# Patient Record
Sex: FEMALE | Race: WHITE | NOT HISPANIC OR LATINO | Employment: UNEMPLOYED | URBAN - NONMETROPOLITAN AREA
[De-identification: names, ages, dates, MRNs, and addresses within clinical notes are randomized per-mention and may not be internally consistent; named-entity substitution may affect disease eponyms.]

---

## 2017-04-17 ENCOUNTER — APPOINTMENT (EMERGENCY)
Dept: RADIOLOGY | Facility: HOSPITAL | Age: 47
End: 2017-04-17
Payer: COMMERCIAL

## 2017-04-17 ENCOUNTER — HOSPITAL ENCOUNTER (EMERGENCY)
Facility: HOSPITAL | Age: 47
Discharge: HOME | End: 2017-04-17
Attending: EMERGENCY MEDICINE | Admitting: EMERGENCY MEDICINE
Payer: COMMERCIAL

## 2017-04-17 VITALS
RESPIRATION RATE: 18 BRPM | DIASTOLIC BLOOD PRESSURE: 84 MMHG | TEMPERATURE: 98.1 F | OXYGEN SATURATION: 97 % | SYSTOLIC BLOOD PRESSURE: 126 MMHG | WEIGHT: 130 LBS | HEART RATE: 85 BPM

## 2017-04-17 DIAGNOSIS — J20.9 ACUTE BRONCHITIS: Primary | ICD-10-CM

## 2017-04-17 PROCEDURE — 71020 HB CHEST X-RAY 2VW FRONTAL&LATL: CPT

## 2017-04-17 PROCEDURE — 99283 EMERGENCY DEPT VISIT LOW MDM: CPT

## 2017-04-17 RX ORDER — CITALOPRAM 40 MG/1
40 TABLET ORAL DAILY
COMMUNITY

## 2017-04-17 RX ORDER — BENZONATATE 100 MG/1
100 CAPSULE ORAL ONCE
Status: COMPLETED | OUTPATIENT
Start: 2017-04-17 | End: 2017-04-17

## 2017-04-17 RX ORDER — TRAZODONE HYDROCHLORIDE 100 MG/1
100 TABLET ORAL
COMMUNITY

## 2017-04-17 RX ORDER — BENZONATATE 100 MG/1
100 CAPSULE ORAL EVERY 8 HOURS
Qty: 9 CAPSULE | Refills: 0 | Status: SHIPPED | OUTPATIENT
Start: 2017-04-17 | End: 2017-04-20

## 2017-04-17 RX ORDER — PANTOPRAZOLE SODIUM 40 MG/1
40 TABLET, DELAYED RELEASE ORAL DAILY
COMMUNITY

## 2017-04-17 RX ORDER — SUCRALFATE 1 G/1
1 TABLET ORAL 4 TIMES DAILY
COMMUNITY

## 2017-04-17 RX ADMIN — BENZONATATE 100 MG: 100 CAPSULE ORAL at 20:56

## 2021-06-16 ENCOUNTER — HOSPITAL ENCOUNTER (EMERGENCY)
Facility: HOSPITAL | Age: 51
Discharge: HOME | End: 2021-06-16
Attending: EMERGENCY MEDICINE | Admitting: EMERGENCY MEDICINE
Payer: COMMERCIAL

## 2021-06-16 ENCOUNTER — APPOINTMENT (EMERGENCY)
Dept: CT IMAGING | Facility: HOSPITAL | Age: 51
End: 2021-06-16
Payer: COMMERCIAL

## 2021-06-16 VITALS
TEMPERATURE: 97.4 F | WEIGHT: 135 LBS | BODY MASS INDEX: 24.84 KG/M2 | HEART RATE: 66 BPM | HEIGHT: 62 IN | DIASTOLIC BLOOD PRESSURE: 65 MMHG | OXYGEN SATURATION: 98 % | RESPIRATION RATE: 16 BRPM | SYSTOLIC BLOOD PRESSURE: 114 MMHG

## 2021-06-16 DIAGNOSIS — M54.9 BACK PAIN: Primary | ICD-10-CM

## 2021-06-16 DIAGNOSIS — R10.9 ACUTE RIGHT FLANK PAIN: ICD-10-CM

## 2021-06-16 DIAGNOSIS — R93.5 ABNORMAL CT OF THE ABDOMEN: ICD-10-CM

## 2021-06-16 DIAGNOSIS — R11.0 NAUSEA: ICD-10-CM

## 2021-06-16 LAB
ALBUMIN SERPL BCP-MCNC: 4.3 G/DL (ref 3.5–5.7)
ALP SERPL-CCNC: 70 U/L (ref 40–150)
ALT SERPL W P-5'-P-CCNC: 13 U/L (ref 7–52)
ANION GAP SERPL CALCULATED.3IONS-SCNC: 8 MMOL/L (ref 4–13)
AST SERPL W P-5'-P-CCNC: 12 U/L (ref 13–39)
BACTERIA UR QL AUTO: NORMAL /HPF
BASOPHILS # BLD AUTO: 0.1 THOUSANDS/ΜL (ref 0–0.1)
BASOPHILS NFR BLD AUTO: 0 % (ref 0–2)
BILIRUB SERPL-MCNC: 0.3 MG/DL (ref 0.2–1)
BILIRUB UR QL STRIP: NEGATIVE
BUN SERPL-MCNC: 10 MG/DL (ref 7–25)
CALCIUM SERPL-MCNC: 9.3 MG/DL (ref 8.6–10.5)
CHLORIDE SERPL-SCNC: 107 MMOL/L (ref 98–107)
CLARITY UR: CLEAR
CO2 SERPL-SCNC: 24 MMOL/L (ref 21–31)
COLOR UR: YELLOW
CREAT SERPL-MCNC: 0.77 MG/DL (ref 0.6–1.2)
EOSINOPHIL # BLD AUTO: 0 THOUSAND/ΜL (ref 0–0.61)
EOSINOPHIL NFR BLD AUTO: 0 % (ref 0–5)
ERYTHROCYTE [DISTWIDTH] IN BLOOD BY AUTOMATED COUNT: 13.8 % (ref 11.5–14.5)
GFR SERPL CREATININE-BSD FRML MDRD: 91 ML/MIN/1.73SQ M
GLUCOSE SERPL-MCNC: 129 MG/DL (ref 65–99)
GLUCOSE UR STRIP-MCNC: NEGATIVE MG/DL
HCT VFR BLD AUTO: 40.7 % (ref 42–47)
HGB BLD-MCNC: 13.8 G/DL (ref 12–16)
HGB UR QL STRIP.AUTO: ABNORMAL
KETONES UR STRIP-MCNC: NEGATIVE MG/DL
LEUKOCYTE ESTERASE UR QL STRIP: NEGATIVE
LYMPHOCYTES # BLD AUTO: 2 THOUSANDS/ΜL (ref 0.6–4.47)
LYMPHOCYTES NFR BLD AUTO: 14 % (ref 21–51)
MCH RBC QN AUTO: 32.6 PG (ref 26–34)
MCHC RBC AUTO-ENTMCNC: 33.9 G/DL (ref 31–37)
MCV RBC AUTO: 96 FL (ref 81–99)
MONOCYTES # BLD AUTO: 0.4 THOUSAND/ΜL (ref 0.17–1.22)
MONOCYTES NFR BLD AUTO: 3 % (ref 2–12)
NEUTROPHILS # BLD AUTO: 11.8 THOUSANDS/ΜL (ref 1.4–6.5)
NEUTS SEG NFR BLD AUTO: 83 % (ref 42–75)
NITRITE UR QL STRIP: NEGATIVE
NON-SQ EPI CELLS URNS QL MICRO: NORMAL /HPF
PH UR STRIP.AUTO: 6 [PH]
PLATELET # BLD AUTO: 351 THOUSANDS/UL (ref 149–390)
PMV BLD AUTO: 7.7 FL (ref 8.6–11.7)
POTASSIUM SERPL-SCNC: 4.2 MMOL/L (ref 3.5–5.5)
PROT SERPL-MCNC: 6.4 G/DL (ref 6.4–8.9)
PROT UR STRIP-MCNC: NEGATIVE MG/DL
RBC # BLD AUTO: 4.22 MILLION/UL (ref 3.9–5.2)
RBC #/AREA URNS AUTO: NORMAL /HPF
SODIUM SERPL-SCNC: 139 MMOL/L (ref 134–143)
SP GR UR STRIP.AUTO: <=1.005 (ref 1–1.03)
UROBILINOGEN UR QL STRIP.AUTO: 0.2 E.U./DL
WBC # BLD AUTO: 14.2 THOUSAND/UL (ref 4.8–10.8)
WBC #/AREA URNS AUTO: NORMAL /HPF

## 2021-06-16 PROCEDURE — 96361 HYDRATE IV INFUSION ADD-ON: CPT

## 2021-06-16 PROCEDURE — G1004 CDSM NDSC: HCPCS

## 2021-06-16 PROCEDURE — 81003 URINALYSIS AUTO W/O SCOPE: CPT | Performed by: PHYSICIAN ASSISTANT

## 2021-06-16 PROCEDURE — 85025 COMPLETE CBC W/AUTO DIFF WBC: CPT | Performed by: PHYSICIAN ASSISTANT

## 2021-06-16 PROCEDURE — 36415 COLL VENOUS BLD VENIPUNCTURE: CPT | Performed by: PHYSICIAN ASSISTANT

## 2021-06-16 PROCEDURE — 81001 URINALYSIS AUTO W/SCOPE: CPT | Performed by: PHYSICIAN ASSISTANT

## 2021-06-16 PROCEDURE — 80053 COMPREHEN METABOLIC PANEL: CPT | Performed by: PHYSICIAN ASSISTANT

## 2021-06-16 PROCEDURE — 96374 THER/PROPH/DIAG INJ IV PUSH: CPT

## 2021-06-16 PROCEDURE — 99285 EMERGENCY DEPT VISIT HI MDM: CPT | Performed by: PHYSICIAN ASSISTANT

## 2021-06-16 PROCEDURE — 96375 TX/PRO/DX INJ NEW DRUG ADDON: CPT

## 2021-06-16 PROCEDURE — 74176 CT ABD & PELVIS W/O CONTRAST: CPT

## 2021-06-16 PROCEDURE — 99284 EMERGENCY DEPT VISIT MOD MDM: CPT

## 2021-06-16 RX ORDER — BACLOFEN 10 MG/1
10 TABLET ORAL 3 TIMES DAILY
Qty: 12 TABLET | Refills: 0 | Status: SHIPPED | OUTPATIENT
Start: 2021-06-16 | End: 2021-06-20

## 2021-06-16 RX ORDER — DIAZEPAM 5 MG/ML
2.5 INJECTION, SOLUTION INTRAMUSCULAR; INTRAVENOUS ONCE
Status: COMPLETED | OUTPATIENT
Start: 2021-06-16 | End: 2021-06-16

## 2021-06-16 RX ORDER — KETOROLAC TROMETHAMINE 30 MG/ML
15 INJECTION, SOLUTION INTRAMUSCULAR; INTRAVENOUS ONCE
Status: COMPLETED | OUTPATIENT
Start: 2021-06-16 | End: 2021-06-16

## 2021-06-16 RX ORDER — ONDANSETRON 2 MG/ML
4 INJECTION INTRAMUSCULAR; INTRAVENOUS ONCE
Status: COMPLETED | OUTPATIENT
Start: 2021-06-16 | End: 2021-06-16

## 2021-06-16 RX ORDER — LINACLOTIDE 145 UG/1
145 CAPSULE, GELATIN COATED ORAL DAILY
COMMUNITY

## 2021-06-16 RX ORDER — MONTELUKAST SODIUM 10 MG/1
10 TABLET ORAL
COMMUNITY

## 2021-06-16 RX ORDER — BUPROPION HYDROCHLORIDE 150 MG/1
150 TABLET ORAL DAILY
COMMUNITY

## 2021-06-16 RX ORDER — ONDANSETRON 4 MG/1
4 TABLET, ORALLY DISINTEGRATING ORAL EVERY 6 HOURS PRN
Qty: 10 TABLET | Refills: 0 | Status: SHIPPED | OUTPATIENT
Start: 2021-06-16

## 2021-06-16 RX ADMIN — ONDANSETRON 4 MG: 2 INJECTION INTRAMUSCULAR; INTRAVENOUS at 10:50

## 2021-06-16 RX ADMIN — DIAZEPAM 2.5 MG: 10 INJECTION, SOLUTION INTRAMUSCULAR; INTRAVENOUS at 12:29

## 2021-06-16 RX ADMIN — SODIUM CHLORIDE 1000 ML: 0.9 INJECTION, SOLUTION INTRAVENOUS at 10:49

## 2021-06-16 RX ADMIN — KETOROLAC TROMETHAMINE 15 MG: 30 INJECTION, SOLUTION INTRAMUSCULAR at 10:50

## 2021-06-16 NOTE — ED PROVIDER NOTES
History  Chief Complaint   Patient presents with    Back Pain     According to the patient, she has had bilateral lower back pain that started over the weekend but the patient reports having more pain today  This is a 82-year-old female patient who presents with 1 week of back pain right greater than left  She states that she has urgency frequency and dysuria  Was seen by her family doctor a few days ago was put on Medrol for back pain  She states that the suprapubic pain has been constant over last 2 days and the back pains intermittent  But is bilateral   Last night she started with chills and nausea  No vomiting no documented fever no headache blurred vision double vision cough congestion sore throat no chest pain or shortness of breath  Nothing makes it better or worse she has taken Tylenol over-the-counter  Differential diagnosis includes not limited to UTI, kidney stone, pyelonephritis  Musculoskeletal is also on the differential            Prior to Admission Medications   Prescriptions Last Dose Informant Patient Reported? Taking?    Cetirizine HCl (ZYRTEC PO)   Yes Yes   Sig: Take 10 mg by mouth   buPROPion (WELLBUTRIN XL) 150 mg 24 hr tablet   Yes Yes   Sig: Take 150 mg by mouth daily   citalopram (CeleXA) 40 mg tablet   Yes No   Sig: Take 40 mg by mouth daily   linaCLOtide (Linzess) 145 MCG CAPS   Yes No   Sig: Take 145 mcg by mouth daily   montelukast (SINGULAIR) 10 mg tablet   Yes Yes   Sig: Take 10 mg by mouth daily at bedtime   pantoprazole (PROTONIX) 40 mg tablet   Yes No   Sig: Take 40 mg by mouth daily   sucralfate (CARAFATE) 1 g tablet Not Taking at Unknown time  Yes No   Sig: Take 1 g by mouth 4 (four) times a day   traZODone (DESYREL) 100 mg tablet   Yes No   Sig: Take 100 mg by mouth daily at bedtime      Facility-Administered Medications: None       Past Medical History:   Diagnosis Date    Celiac disease     Fibromyalgia     GERD (gastroesophageal reflux disease)        Past Surgical History:   Procedure Laterality Date    APPENDECTOMY      CHOLECYSTECTOMY      HERNIA REPAIR      HYSTERECTOMY      TONSILLECTOMY         History reviewed  No pertinent family history  I have reviewed and agree with the history as documented  E-Cigarette/Vaping    E-Cigarette Use Never User      E-Cigarette/Vaping Substances     Social History     Tobacco Use    Smoking status: Current Every Day Smoker     Packs/day: 0 50     Types: Cigarettes    Smokeless tobacco: Never Used   Vaping Use    Vaping Use: Never used   Substance Use Topics    Alcohol use: No    Drug use: No       Review of Systems   Constitutional: Positive for chills  Negative for fatigue and fever  HENT: Negative for congestion and hearing loss  Eyes: Negative for photophobia and pain  Respiratory: Negative for cough and chest tightness  Cardiovascular: Negative for chest pain and leg swelling  Gastrointestinal: Positive for abdominal pain and nausea  Negative for anal bleeding, blood in stool, constipation, diarrhea, rectal pain and vomiting  Endocrine: Negative for polydipsia and polyphagia  Genitourinary: Positive for dysuria, flank pain and urgency  Negative for decreased urine volume, difficulty urinating, enuresis, frequency, genital sores, hematuria, menstrual problem, pelvic pain, vaginal bleeding, vaginal discharge and vaginal pain  Musculoskeletal: Negative for arthralgias and gait problem  Skin: Negative for pallor and rash  Allergic/Immunologic: Negative for environmental allergies and food allergies  Neurological: Negative for dizziness and headaches  Psychiatric/Behavioral: Negative for agitation and confusion  Physical Exam  Physical Exam  Vitals and nursing note reviewed  Constitutional:       General: She is not in acute distress  Appearance: She is well-developed  She is not ill-appearing, toxic-appearing or diaphoretic  HENT:      Head: Normocephalic and atraumatic  Right Ear: External ear normal       Left Ear: External ear normal       Nose: Nose normal       Mouth/Throat:      Mouth: Mucous membranes are moist       Pharynx: Oropharynx is clear  No oropharyngeal exudate  Eyes:      Conjunctiva/sclera: Conjunctivae normal       Pupils: Pupils are equal, round, and reactive to light  Cardiovascular:      Rate and Rhythm: Normal rate and regular rhythm  Pulmonary:      Effort: Pulmonary effort is normal       Breath sounds: Normal breath sounds  Abdominal:      General: Bowel sounds are normal       Palpations: Abdomen is soft  Tenderness: There is no abdominal tenderness  Musculoskeletal:         General: Normal range of motion  Cervical back: Normal range of motion and neck supple  Back:    Skin:     General: Skin is warm  Capillary Refill: Capillary refill takes less than 2 seconds  Neurological:      General: No focal deficit present  Mental Status: She is alert and oriented to person, place, and time  Mental status is at baseline     Psychiatric:         Behavior: Behavior normal          Vital Signs  ED Triage Vitals [06/16/21 1021]   Temperature Pulse Respirations Blood Pressure SpO2   (!) 97 4 °F (36 3 °C) 75 18 122/68 97 %      Temp Source Heart Rate Source Patient Position - Orthostatic VS BP Location FiO2 (%)   Temporal Monitor Lying Right arm --      Pain Score       9           Vitals:    06/16/21 1021 06/16/21 1231   BP: 122/68 114/65   Pulse: 75 66   Patient Position - Orthostatic VS: Lying Lying         Visual Acuity      ED Medications  Medications   sodium chloride 0 9 % bolus 1,000 mL (0 mL Intravenous Stopped 6/16/21 1223)   ketorolac (TORADOL) injection 15 mg (15 mg Intravenous Given 6/16/21 1050)   ondansetron (ZOFRAN) injection 4 mg (4 mg Intravenous Given 6/16/21 1050)   diazepam (VALIUM) injection 2 5 mg (2 5 mg Intravenous Given 6/16/21 1229)       Diagnostic Studies  Results Reviewed     Procedure Component Value Units Date/Time    Urine Microscopic [[id number]]  (Normal) Collected: 06/16/21 1030    Lab Status: Final result Specimen: Urine, Clean Catch Updated: 06/16/21 1144     RBC, UA 2-4 /hpf      WBC, UA None Seen /hpf      Epithelial Cells Occasional /hpf      Bacteria, UA None Seen /hpf     Comprehensive metabolic panel [[id number]]  (Abnormal) Collected: 06/16/21 1049    Lab Status: Final result Specimen: Blood from Arm, Left Updated: 06/16/21 1113     Sodium 139 mmol/L      Potassium 4 2 mmol/L      Chloride 107 mmol/L      CO2 24 mmol/L      ANION GAP 8 mmol/L      BUN 10 mg/dL      Creatinine 0 77 mg/dL      Glucose 129 mg/dL      Calcium 9 3 mg/dL      AST 12 U/L      ALT 13 U/L      Alkaline Phosphatase 70 U/L      Total Protein 6 4 g/dL      Albumin 4 3 g/dL      Total Bilirubin 0 30 mg/dL      eGFR 91 ml/min/1 73sq m     Narrative:      National Kidney Disease Foundation guidelines for Chronic Kidney Disease (CKD):     Stage 1 with normal or high GFR (GFR > 90 mL/min/1 73 square meters)    Stage 2 Mild CKD (GFR = 60-89 mL/min/1 73 square meters)    Stage 3A Moderate CKD (GFR = 45-59 mL/min/1 73 square meters)    Stage 3B Moderate CKD (GFR = 30-44 mL/min/1 73 square meters)    Stage 4 Severe CKD (GFR = 15-29 mL/min/1 73 square meters)    Stage 5 End Stage CKD (GFR <15 mL/min/1 73 square meters)  Note: GFR calculation is accurate only with a steady state creatinine    CBC and differential [[id number]]  (Abnormal) Collected: 06/16/21 1049    Lab Status: Final result Specimen: Blood from Arm, Left Updated: 06/16/21 1101     WBC 14 20 Thousand/uL      RBC 4 22 Million/uL      Hemoglobin 13 8 g/dL      Hematocrit 40 7 %      MCV 96 fL      MCH 32 6 pg      MCHC 33 9 g/dL      RDW 13 8 %      MPV 7 7 fL      Platelets 049 Thousands/uL      Neutrophils Relative 83 %      Lymphocytes Relative 14 %      Monocytes Relative 3 %      Eosinophils Relative 0 %      Basophils Relative 0 %      Neutrophils Absolute 11 80 Thousands/µL      Lymphocytes Absolute 2 00 Thousands/µL      Monocytes Absolute 0 40 Thousand/µL      Eosinophils Absolute 0 00 Thousand/µL      Basophils Absolute 0 10 Thousands/µL     UA w Reflex to Microscopic w Reflex to Culture [[id number]]  (Abnormal) Collected: 06/16/21 1030    Lab Status: Final result Specimen: Urine, Clean Catch Updated: 06/16/21 1037     Color, UA Yellow     Clarity, UA Clear     Specific Gravity, UA <=1 005     pH, UA 6 0     Leukocytes, UA Negative     Nitrite, UA Negative     Protein, UA Negative mg/dl      Glucose, UA Negative mg/dl      Ketones, UA Negative mg/dl      Urobilinogen, UA 0 2 E U /dl      Bilirubin, UA Negative     Blood, UA 3+                 CT renal stone study abdomen pelvis without contrast   Final Result by Jessica Deleon MD (06/16 1206)      1  No findings of obstructive uropathy or urinary tract calculi  2   Soft tissue contour deformity along the lesser curvature of the stomach, could be due to collapsed stomach but difficult to exclude a mass with evaluation limited  Consider correlation with endoscopy if clinically warranted  The study was marked in [facility] for immediate notification  Workstation performed: [id number]                    Procedures  Procedures         ED Course                                           MDM  Number of Diagnoses or Management Options  Acute right flank pain: new and requires workup  Back pain: new and requires workup  Diagnosis management comments: 42-year-old female patient who presented with right flank/back pain  Was on steroids without improvement  Patient had a negative CT scan except for questionable retraction of stomach which was due to being empty versus mass  Patient has no pain to that area  Sent clinical correlation on the study I do not feel there is however I will refer her to Gastroenterology    Patient's symptoms have completely resolved her Toradol and Valium which makes me think it may be from musculoskeletal   Patient did have some blood in her urine which could be from a passed stone  She will be referred to GI because of the stomach finding, Urology due to the blood  Patient has had hysterectomy,, appendectomy, cholecystectomy  All labs and studies reviewed, she is feeling better she is stable for discharge       Amount and/or Complexity of Data Reviewed  Clinical lab tests: ordered and reviewed  Tests in the radiology section of CPT®: ordered and reviewed  Discussion of test results with the performing providers: yes  Discuss the patient with other providers: yes  Independent visualization of images, tracings, or specimens: yes    Risk of Complications, Morbidity, and/or Mortality  Presenting problems: moderate  Diagnostic procedures: moderate  Management options: moderate    Patient Progress  Patient progress: stable      Disposition  Final diagnoses:   Back pain   Acute right flank pain   Nausea   Abnormal CT of the abdomen     Time reflects when diagnosis was documented in both MDM as applicable and the Disposition within this note     Time User Action Codes Description Comment    6/16/2021 12:55 PM Dinapoli, Tirso Add [M54 9] Back pain     6/16/2021 12:55 PM Fletcherapoli Tirso Add [R10 9] Acute right flank pain     6/16/2021  1:16 PM Dinapoli, Tirso Add [R11 0] Nausea     6/16/2021  1:17 PM Dinapoli, Tirso Add [R93 5] Abnormal CT of the abdomen       ED Disposition     ED Disposition Condition Date/Time Comment    Discharge Stable Wed Jun 16, 2021 12:55 PM Irene Villarreal discharge to home/self care              Follow-up Information     Follow up With Specialties Details Why Contact Info Additional Information    [facility] [place] [facility] Call today Follow-up for abnormal CT scan [address] [address]  Tejal Tovar [address] [facility] [place], [address] [facility], [place], [address], [phone]    Yumiko Alonso MD Urology Schedule an appointment as soon as possible for a visit   [address] [facility] [address]  [phone]             Patient's Medications   Discharge Prescriptions    BACLOFEN 10 MG TABLET    Take 1 tablet (10 mg total) by mouth 3 (three) times a day for 4 days       Start Date: 6/16/2021 End Date: 6/20/2021       Order Dose: 10 mg       Quantity: 12 tablet    Refills: 0    ONDANSETRON (ZOFRAN-ODT) 4 MG DISINTEGRATING TABLET    Take 1 tablet (4 mg total) by mouth every 6 (six) hours as needed for nausea or vomiting       Start Date: 6/16/2021 End Date: --       Order Dose: 4 mg       Quantity: 10 tablet    Refills: 0     No discharge procedures on file      PDMP Review     None          ED Provider  Electronically Signed by           Martine Berger PA-C  06/16/21 1755

## 2021-06-16 NOTE — Clinical Note
Ifrah Mann was seen and treated in our emergency department on 6/16/2021  Diagnosis:     Susie Guevarasiddhartha    She may return on this date: 06/18/2021         If you have any questions or concerns, please don't hesitate to call        Lourdes Lao RN    ______________________________           _______________          _______________  Hospital Representative                              Date                                Time

## 2021-06-16 NOTE — DISCHARGE INSTRUCTIONS
Please follow-up with a gastroenterologist listed  Your CT scan showed an abnormality on her stomach it could possibly be an incidental finding but needs to be investigated with an EGD/scope

## 2021-06-16 NOTE — Clinical Note
Robert Alcantara was seen and treated in our emergency department on 6/16/2021  Diagnosis:     Kavita Babcock  may return to work on return date  She may return on this date: 06/21/2021         If you have any questions or concerns, please don't hesitate to call        Yahir Eugene RN    ______________________________           _______________          _______________  Hospital Representative                              Date                                Time

## 2022-10-19 ENCOUNTER — APPOINTMENT (OUTPATIENT)
Dept: RADIOLOGY | Facility: CLINIC | Age: 52
End: 2022-10-19
Payer: COMMERCIAL

## 2022-10-19 ENCOUNTER — OFFICE VISIT (OUTPATIENT)
Dept: URGENT CARE | Facility: CLINIC | Age: 52
End: 2022-10-19
Payer: COMMERCIAL

## 2022-10-19 VITALS
SYSTOLIC BLOOD PRESSURE: 124 MMHG | HEART RATE: 74 BPM | OXYGEN SATURATION: 98 % | DIASTOLIC BLOOD PRESSURE: 64 MMHG | BODY MASS INDEX: 25.03 KG/M2 | HEIGHT: 62 IN | RESPIRATION RATE: 16 BRPM | WEIGHT: 136 LBS | TEMPERATURE: 98.4 F

## 2022-10-19 DIAGNOSIS — R07.81 RIB PAIN ON LEFT SIDE: ICD-10-CM

## 2022-10-19 DIAGNOSIS — S22.32XA CLOSED FRACTURE OF ONE RIB OF LEFT SIDE, INITIAL ENCOUNTER: Primary | ICD-10-CM

## 2022-10-19 PROBLEM — K90.0 CELIAC DISEASE: Status: ACTIVE | Noted: 2019-02-12

## 2022-10-19 PROBLEM — E53.8 VITAMIN B 12 DEFICIENCY: Status: ACTIVE | Noted: 2019-02-12

## 2022-10-19 PROBLEM — F32.A DEPRESSION: Status: ACTIVE | Noted: 2019-02-12

## 2022-10-19 PROCEDURE — 71101 X-RAY EXAM UNILAT RIBS/CHEST: CPT

## 2022-10-19 PROCEDURE — 99203 OFFICE O/P NEW LOW 30 MIN: CPT | Performed by: PHYSICIAN ASSISTANT

## 2022-10-19 RX ORDER — MELOXICAM 15 MG/1
15 TABLET ORAL DAILY
Qty: 20 TABLET | Refills: 0 | Status: SHIPPED | OUTPATIENT
Start: 2022-10-19 | End: 2022-11-08

## 2022-10-19 RX ORDER — ALBUTEROL SULFATE 90 UG/1
AEROSOL, METERED RESPIRATORY (INHALATION)
COMMUNITY
Start: 2022-07-22

## 2022-10-19 NOTE — PATIENT INSTRUCTIONS
Rib Fracture   WHAT YOU NEED TO KNOW:   A rib fracture is a crack or break in a rib bone  Rib fractures usually heal within 6 weeks  You should be able to return to your usual activities before that time  DISCHARGE INSTRUCTIONS:   Call your local emergency number (911 in the [address] [address]) if:   You have trouble breathing  You have new or increased pain  Return to the emergency department if:   Your pain does not get better, even after treatment  You have a fever or a cough  Call your doctor if:   You have questions or concerns about your condition or care  Medicines: You may need any of the following:  NSAIDs , such as ibuprofen, help decrease swelling, pain, and fever  This medicine is available with or without a doctor's order  NSAIDs can cause stomach bleeding or kidney problems in certain people  If you take blood thinner medicine, always ask your healthcare provider if NSAIDs are safe for you  Always read the medicine label and follow directions  Prescription pain medicine  may be given  Ask your healthcare provider how to take this medicine safely  Some prescription pain medicines contain acetaminophen  Do not take other medicines that contain acetaminophen without talking to your healthcare provider  Too much acetaminophen may cause liver damage  Prescription pain medicine may cause constipation  Ask your healthcare provider how to prevent or treat constipation  Take your medicine as directed  Contact your healthcare provider if you think your medicine is not helping or if you have side effects  Tell him or her if you are allergic to any medicine  Keep a list of the medicines, vitamins, and herbs you take  Include the amounts, and when and why you take them  Bring the list or the pill bottles to follow-up visits  Carry your medicine list with you in case of an emergency  Self-care: Take deep breaths and cough 10 times each hour  This will decrease your risk for a lung infection  Hug a pillow on your injured side to decrease pain while you take deep breaths  Take a deep breath and hold it for as long as you can  Let the air out and then cough  Deep breaths help open your airway  You may be given an incentive spirometer to help you take deep breaths  Put the plastic piece in your mouth and take a slow, deep breath, then let the air out and cough  Repeat these steps 10 times every hour  Rest and limit activity as directed  Do not pull, push, or lift objects  Start to do more as your pain decreases  Ask your healthcare provider how much activity you can do  Apply ice  on your chest near your fractured rib for 15 to 20 minutes every hour or as directed  Use an ice pack, or put crushed ice in a plastic bag  Cover it with a towel  Ice helps prevent tissue damage and decreases swelling and pain  Follow up with your doctor as directed:  Write down your questions so you remember to ask them during your visits  © Copyright [employer] 2022 Information is for End User's use only and may not be sold, redistributed or otherwise used for commercial purposes  All illustrations and images included in CareNotes® are the copyrighted property of A D [employer] M [employer]  or [address] [place], [place] [address]cristian mendel   The above information is an  only  It is not intended as medical advice for individual conditions or treatments  Talk to your doctor, nurse or pharmacist before following any medical regimen to see if it is safe and effective for you

## 2022-10-19 NOTE — PROGRESS NOTES
[facility]        NAME: Audra Duffy is a 50 y o  female  : 1970    MRN: [id number]  DATE: 2022  TIME: 12:37 PM    Assessment and Plan   Closed fracture of one rib of left side, initial encounter [S22 32XA]  1  Closed fracture of one rib of left side, initial encounter     2  Rib pain on left side  XR ribs left w pa chest min 3 views    meloxicam (Mobic) 15 mg tablet         Patient Instructions   Patient Instructions     Rib Fracture   WHAT YOU NEED TO KNOW:   A rib fracture is a crack or break in a rib bone  Rib fractures usually heal within 6 weeks  You should be able to return to your usual activities before that time  DISCHARGE INSTRUCTIONS:   Call your local emergency number (911 in the [address] [place] [address],[address]) if:   · You have trouble breathing  · You have new or increased pain  Return to the emergency department if:   · Your pain does not get better, even after treatment  · You have a fever or a cough  Call your doctor if:   · You have questions or concerns about your condition or care  Medicines: You may need any of the following:  · NSAIDs , such as ibuprofen, help decrease swelling, pain, and fever  This medicine is available with or without a doctor's order  NSAIDs can cause stomach bleeding or kidney problems in certain people  If you take blood thinner medicine, always ask your healthcare provider if NSAIDs are safe for you  Always read the medicine label and follow directions  · Prescription pain medicine  may be given  Ask your healthcare provider how to take this medicine safely  Some prescription pain medicines contain acetaminophen  Do not take other medicines that contain acetaminophen without talking to your healthcare provider  Too much acetaminophen may cause liver damage  Prescription pain medicine may cause constipation  Ask your healthcare provider how to prevent or treat constipation  · Take your medicine as directed    Contact your healthcare provider if you think your medicine is not helping or if you have side effects  Tell him or her if you are allergic to any medicine  Keep a list of the medicines, vitamins, and herbs you take  Include the amounts, and when and why you take them  Bring the list or the pill bottles to follow-up visits  Carry your medicine list with you in case of an emergency  Self-care:   · Take deep breaths and cough 10 times each hour  This will decrease your risk for a lung infection  Hug a pillow on your injured side to decrease pain while you take deep breaths  Take a deep breath and hold it for as long as you can  Let the air out and then cough  Deep breaths help open your airway  You may be given an incentive spirometer to help you take deep breaths  Put the plastic piece in your mouth and take a slow, deep breath, then let the air out and cough  Repeat these steps 10 times every hour  · Rest and limit activity as directed  Do not pull, push, or lift objects  Start to do more as your pain decreases  Ask your healthcare provider how much activity you can do  · Apply ice  on your chest near your fractured rib for 15 to 20 minutes every hour or as directed  Use an ice pack, or put crushed ice in a plastic bag  Cover it with a towel  Ice helps prevent tissue damage and decreases swelling and pain  Follow up with your doctor as directed:  Write down your questions so you remember to ask them during your visits  © Copyright [employer] 2022 Information is for End User's use only and may not be sold, redistributed or otherwise used for commercial purposes  All illustrations and images included in CareNotes® are the copyrighted property of [employer]  or Alina Ward   The above information is an  only  It is not intended as medical advice for individual conditions or treatments   Talk to your doctor, nurse or pharmacist before following any medical regimen to see if it is safe and effective for you  Follow up with PCP in 3-5 days  Proceed to  ER if symptoms worsen  Chief Complaint     Chief Complaint   Patient presents with   • Rib Pain     Left rib, started Sunday night, called PCP no appointment recommended urgent care         History of Present Illness       The May she patient presents to the clinic for left-sided lower rib pain since Saturday  She states that she started with rib pain after moving air conditioner on Saturday  She states that was a very large air conditioner and she felt sudden pain located in her left lower ribs  She describes the pain as a sharp pain that is located in the left lateral and anterior ribs  Pain is sharp in nature  The pain is worse with moving, coughing, breathing, and twisting  She states that she has a chronic cough as she is a smoker but denies any recent fevers, chills, productive cough, nausea, vomiting, or sinus pressure  She states that she initially tried Tylenol for her pain which did not seem to help with her symptoms  She states that she had a prescription for 800 mg of ibuprofen and she took 400 mg of that last night which seemed to help better with her pain  Her current pain level is 9/10  Review of Systems   Review of Systems   Constitutional: Negative for chills, fatigue and fever  HENT: Negative for congestion, ear pain, postnasal drip, rhinorrhea, sinus pressure, sinus pain and sore throat  Eyes: Negative for pain and visual disturbance  Respiratory: Negative for cough and shortness of breath  Left lower rib pain   Cardiovascular: Negative for chest pain and palpitations  Gastrointestinal: Negative for abdominal pain, blood in stool, constipation, diarrhea, nausea and vomiting  Genitourinary: Negative for dysuria and hematuria  Musculoskeletal: Negative for arthralgias and back pain  Skin: Negative for color change and rash     Neurological: Negative for dizziness, seizures, syncope and headaches  All other systems reviewed and are negative  Current Medications       Current Outpatient Medications:   •  buPROPion (WELLBUTRIN XL) 150 mg 24 hr tablet, Take 150 mg by mouth daily, Disp: , Rfl:   •  Cetirizine HCl (ZYRTEC PO), Take 10 mg by mouth, Disp: , Rfl:   •  citalopram (CeleXA) 40 mg tablet, Take 40 mg by mouth daily, Disp: , Rfl:   •  linaCLOtide (Linzess) 145 MCG CAPS, Take 145 mcg by mouth daily, Disp: , Rfl:   •  meloxicam (Mobic) 15 mg tablet, Take 1 tablet (15 mg total) by mouth daily for 20 days, Disp: 20 tablet, Rfl: 0  •  montelukast (SINGULAIR) 10 mg tablet, Take 10 mg by mouth daily at bedtime, Disp: , Rfl:   •  pantoprazole (PROTONIX) 40 mg tablet, Take 40 mg by mouth daily, Disp: , Rfl:   •  traZODone (DESYREL) 100 mg tablet, Take 100 mg by mouth daily at bedtime, Disp: , Rfl:   •  albuterol (PROVENTIL HFA,VENTOLIN HFA) 90 mcg/act inhaler, INHALE 2 PUFFS EVERY 6 HOURS AS NEEDED FOR WHEEZING, Disp: , Rfl:     Current Allergies     Allergies as of 10/19/2022 - Reviewed 10/19/2022   Allergen Reaction Noted   • Morphine Anaphylaxis 02/02/2011   • Meperidine and related Other (See Comments) 06/12/2020            The following portions of the patient's history were reviewed and updated as appropriate: allergies, current medications, past family history, past medical history, past social history, past surgical history and problem list      Past Medical History:   Diagnosis Date   • Celiac disease    • Fibromyalgia    • GERD (gastroesophageal reflux disease)        Past Surgical History:   Procedure Laterality Date   • APPENDECTOMY     • CHOLECYSTECTOMY     • HERNIA REPAIR     • HYSTERECTOMY     • TONSILLECTOMY         History reviewed  No pertinent family history  Medications have been verified          Objective   /64   Pulse 74   Temp 98 4 °F (36 9 °C)   Resp 16   Ht 5' 2" (1 575 m)   Wt 61 7 kg (136 lb)   SpO2 98%   BMI 24 87 kg/m²        Physical Exam Physical Exam  Constitutional:       Appearance: She is well-developed  She is not diaphoretic  Comments: The patient is in pain   HENT:      Head: Normocephalic  Right Ear: Tympanic membrane normal       Left Ear: Tympanic membrane normal       Nose: Rhinorrhea present  Eyes:      General:         Right eye: No discharge  Left eye: No discharge  Pupils: Pupils are equal, round, and reactive to light  Neck:      Thyroid: No thyromegaly  Cardiovascular:      Rate and Rhythm: Normal rate  Heart sounds: No murmur heard  Pulmonary:      Effort: Pulmonary effort is normal  No respiratory distress  Breath sounds: Wheezing and rhonchi present  No rales  Chest:      Chest wall: No tenderness  Comments: -there is tenderness to palpation noted at the left lower anterior ribs and left lateral ribs  There is pain to palpation noted with rotation of the left ribs  Abdominal:      General: There is no distension  Palpations: Abdomen is soft  Tenderness: There is no abdominal tenderness  There is no guarding or rebound  Musculoskeletal:         General: Normal range of motion  Cervical back: Normal range of motion  Lymphadenopathy:      Cervical: No cervical adenopathy  Skin:     General: Skin is warm  Neurological:      Mental Status: She is oriented to person, place, and time  -x-ray was reviewed  There is an acute fracture of the left lateral rib         -patient was not instructed to use an incentive spirometer to reduce the risk for pneumonia  I suggest observation for signs of increased cough, fevers, chills, nausea, or vomiting  Patient should follow-up with her primary care doctor    She is on Celexa which interacts with anti-inflammatories, however she also cannot take opioids due to a morphine reaction and her pain is not controlled with Tylenol therefore we will give her a trial of Mobic which is little safer on the stomach another anti-inflammatories  She is aware to observe for signs of stomach bleed including nausea, vomiting, melena, or blood in her stool  She is aware to only use this short term and avoid all other NSAIDs while on Mobic